# Patient Record
Sex: MALE | Race: BLACK OR AFRICAN AMERICAN | Employment: UNEMPLOYED | URBAN - METROPOLITAN AREA
[De-identification: names, ages, dates, MRNs, and addresses within clinical notes are randomized per-mention and may not be internally consistent; named-entity substitution may affect disease eponyms.]

---
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
## 2020-11-05 ENCOUNTER — HOSPITAL ENCOUNTER (EMERGENCY)
Facility: HOSPITAL | Age: 20
Discharge: HOME | End: 2020-11-05
Attending: EMERGENCY MEDICINE
Payer: MEDICAID

## 2020-11-05 VITALS
DIASTOLIC BLOOD PRESSURE: 77 MMHG | RESPIRATION RATE: 17 BRPM | OXYGEN SATURATION: 99 % | SYSTOLIC BLOOD PRESSURE: 127 MMHG | HEART RATE: 81 BPM | TEMPERATURE: 98 F

## 2020-11-05 DIAGNOSIS — L02.01 FACIAL ABSCESS: Primary | ICD-10-CM

## 2020-11-05 PROCEDURE — 87205 SMEAR GRAM STAIN: CPT | Performed by: EMERGENCY MEDICINE

## 2020-11-05 PROCEDURE — 87077 CULTURE AEROBIC IDENTIFY: CPT | Performed by: EMERGENCY MEDICINE

## 2020-11-05 PROCEDURE — 99284 EMERGENCY DEPT VISIT MOD MDM: CPT

## 2020-11-05 PROCEDURE — 87070 CULTURE OTHR SPECIMN AEROBIC: CPT | Performed by: EMERGENCY MEDICINE

## 2020-11-05 PROCEDURE — 10060 I&D ABSCESS SIMPLE/SINGLE: CPT

## 2020-11-05 RX ORDER — CEPHALEXIN 500 MG/1
500 CAPSULE ORAL 4 TIMES DAILY
Qty: 40 CAPSULE | Refills: 0 | Status: SHIPPED | OUTPATIENT
Start: 2020-11-05 | End: 2020-11-15

## 2020-11-05 NOTE — ED INITIAL ASSESSMENT (HPI)
Patient aox3 to ed via private vehicle patient co right cheek wound check x 3 days ago. Now swelling noted to right side of face and right eye. Denies vision changes pain 8/10 denies fever.  +redness

## 2020-11-05 NOTE — ED PROVIDER NOTES
Patient Seen in: [facility] AND [facility] Emergency Department      History   Patient presents with:  Abscess    Stated Complaint: eye and facial swelling    HPI    13-year-old male with history of asthma presents with complaints of facial swelling and mild bart through XII intact. Skin: warm and dry, no rashes. There is a 2 cm area of induration and tenderness to the right cheek with some superficial scabbing towards the center. There is some swelling noted to the right cheek and beneath the right eye.   There

## 2021-08-25 ENCOUNTER — HOSPITAL ENCOUNTER (EMERGENCY)
Facility: HOSPITAL | Age: 21
Discharge: HOME | End: 2021-08-25
Attending: EMERGENCY MEDICINE
Payer: MEDICAID

## 2021-08-25 VITALS
WEIGHT: 90 LBS | HEART RATE: 78 BPM | RESPIRATION RATE: 18 BRPM | TEMPERATURE: 98 F | OXYGEN SATURATION: 98 % | DIASTOLIC BLOOD PRESSURE: 77 MMHG | BODY MASS INDEX: 15 KG/M2 | SYSTOLIC BLOOD PRESSURE: 124 MMHG

## 2021-08-25 DIAGNOSIS — S80.861A INSECT BITE OF RIGHT LOWER LEG, INITIAL ENCOUNTER: Primary | ICD-10-CM
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
## 2021-08-25 LAB
BASOPHILS # BLD AUTO: 0.02 X10(3) UL (ref 0–0.2)
BASOPHILS NFR BLD AUTO: 0.5 %
DEPRECATED RDW RBC AUTO: 38.4 FL (ref 35.1–46.3)
EOSINOPHIL # BLD AUTO: 0.25 X10(3) UL (ref 0–0.7)
EOSINOPHIL NFR BLD AUTO: 5.7 %
ERYTHROCYTE [DISTWIDTH] IN BLOOD BY AUTOMATED COUNT: 16.4 % (ref 11–15)
HCT VFR BLD AUTO: 38.1 %
HGB BLD-MCNC: 12.5 G/DL
IMM GRANULOCYTES # BLD AUTO: 0.02 X10(3) UL (ref 0–1)
IMM GRANULOCYTES NFR BLD: 0.5 %
LYMPHOCYTES # BLD AUTO: 1.18 X10(3) UL (ref 1–4)
LYMPHOCYTES NFR BLD AUTO: 26.7 %
MCH RBC QN AUTO: 22 PG (ref 26–34)
MCHC RBC AUTO-ENTMCNC: 32.8 G/DL (ref 31–37)
MCV RBC AUTO: 67 FL
MONOCYTES # BLD AUTO: 0.27 X10(3) UL (ref 0.1–1)
MONOCYTES NFR BLD AUTO: 6.1 %
NEUTROPHILS # BLD AUTO: 2.68 X10 (3) UL (ref 1.5–7.7)
NEUTROPHILS # BLD AUTO: 2.68 X10(3) UL (ref 1.5–7.7)
NEUTROPHILS NFR BLD AUTO: 60.5 %
PLATELET # BLD AUTO: 138 10(3)UL (ref 150–450)
RBC # BLD AUTO: 5.69 X10(6)UL
WBC # BLD AUTO: 4.4 X10(3) UL (ref 4–11)

## 2021-08-25 PROCEDURE — 36415 COLL VENOUS BLD VENIPUNCTURE: CPT

## 2021-08-25 PROCEDURE — 85025 COMPLETE CBC W/AUTO DIFF WBC: CPT | Performed by: EMERGENCY MEDICINE

## 2021-08-25 PROCEDURE — 99283 EMERGENCY DEPT VISIT LOW MDM: CPT

## 2021-08-25 PROCEDURE — 85060 BLOOD SMEAR INTERPRETATION: CPT | Performed by: EMERGENCY MEDICINE

## 2021-08-25 RX ORDER — HYDROXYZINE HYDROCHLORIDE 25 MG/1
TABLET, FILM COATED ORAL EVERY 4 HOURS PRN
Qty: 20 TABLET | Refills: 0 | Status: SHIPPED | OUTPATIENT
Start: 2021-08-25 | End: 2021-09-24

## 2021-08-25 RX ORDER — DIAPER,BRIEF,INFANT-TODD,DISP
1 EACH MISCELLANEOUS 3 TIMES DAILY PRN
Qty: 1 EACH | Refills: 0 | Status: SHIPPED | OUTPATIENT
Start: 2021-08-25 | End: 2021-09-01

## 2021-08-25 NOTE — ED PROVIDER NOTES
Patient Seen in: [facility] AND [facility] Emergency Department      History   Patient presents with:  Rash Skin Problem    Stated Complaint: rash on legs and back    HPI/Subjective:   HPI    23year old male with a past medical history of sickle cell beta thala Pulmonary effort is normal. No respiratory distress. Musculoskeletal:         General: No deformity. Normal range of motion. Cervical back: Normal range of motion and neck supple. Skin:     General: Skin is warm and dry. Findings: No rash. check a platelet count and this was only mildly below normal, not concerning at this time.           Disposition and Plan     Clinical Impression:  Insect bite of right lower leg, initial encounter  (primary encounter diagnosis)     Disposition:  Discharge

## 2021-09-14 NOTE — ED NOTES
[facility] to arrive in 25 minutes no discharge, no irritation, no pain, no redness, and no visual changes.

## 2023-01-01 ENCOUNTER — ANESTHESIA (OUTPATIENT)
Dept: MEDSURG UNIT | Facility: HOSPITAL | Age: 23
End: 2023-01-01
Payer: MEDICAID

## 2023-01-01 ENCOUNTER — APPOINTMENT (OUTPATIENT)
Dept: GENERAL RADIOLOGY | Facility: HOSPITAL | Age: 23
End: 2023-01-01
Payer: MEDICAID

## 2023-01-01 ENCOUNTER — HOSPITAL ENCOUNTER (INPATIENT)
Facility: HOSPITAL | Age: 23
LOS: 1 days | End: 2023-01-01
Attending: EMERGENCY MEDICINE | Admitting: INTERNAL MEDICINE
Payer: MEDICAID

## 2023-01-01 ENCOUNTER — APPOINTMENT (OUTPATIENT)
Dept: GENERAL RADIOLOGY | Facility: HOSPITAL | Age: 23
End: 2023-01-01
Attending: EMERGENCY MEDICINE
Payer: MEDICAID

## 2023-01-01 ENCOUNTER — APPOINTMENT (OUTPATIENT)
Dept: CV DIAGNOSTICS | Facility: HOSPITAL | Age: 23
End: 2023-01-01
Attending: INTERNAL MEDICINE
Payer: MEDICAID

## 2023-01-01 ENCOUNTER — ANESTHESIA EVENT (OUTPATIENT)
Dept: MEDSURG UNIT | Facility: HOSPITAL | Age: 23
End: 2023-01-01
Payer: MEDICAID

## 2023-01-01 VITALS
HEART RATE: 78 BPM | TEMPERATURE: 86 F | WEIGHT: 120 LBS | SYSTOLIC BLOOD PRESSURE: 80 MMHG | DIASTOLIC BLOOD PRESSURE: 57 MMHG | OXYGEN SATURATION: 48 % | RESPIRATION RATE: 66 BRPM

## 2023-01-01 DIAGNOSIS — E87.5 HYPERKALEMIA: ICD-10-CM

## 2023-01-01 DIAGNOSIS — D64.9 ANEMIA, UNSPECIFIED TYPE: ICD-10-CM

## 2023-01-01 DIAGNOSIS — I46.9 CARDIAC ARREST (HCC): Primary | ICD-10-CM

## 2023-01-01 DIAGNOSIS — E87.20 LACTIC ACIDOSIS: ICD-10-CM

## 2023-01-01 DIAGNOSIS — R57.1 HYPOVOLEMIC SHOCK (HCC): ICD-10-CM

## 2023-01-01 LAB
ALBUMIN SERPL-MCNC: 1.1 G/DL (ref 3.4–5)
ALBUMIN SERPL-MCNC: 3.7 G/DL (ref 3.4–5)
ALBUMIN/GLOB SERPL: 0.6 {RATIO} (ref 1–2)
ALBUMIN/GLOB SERPL: 0.9 {RATIO} (ref 1–2)
ALP LIVER SERPL-CCNC: 271 U/L
ALP LIVER SERPL-CCNC: 662 U/L
ALT SERPL-CCNC: 309 U/L
ALT SERPL-CCNC: 8272 U/L
ANION GAP SERPL CALC-SCNC: 30 MMOL/L (ref 0–18)
ANION GAP SERPL CALC-SCNC: 35 MMOL/L (ref 0–18)
ANTIBODY SCREEN: NEGATIVE
AST SERPL-CCNC: 423 U/L (ref 15–37)
AST SERPL-CCNC: 4516 U/L (ref 15–37)
BASE EXCESS BLDA CALC-SCNC: -26 MMOL/L (ref ?–2)
BASE EXCESS BLDA CALC-SCNC: -9 MMOL/L (ref ?–2)
BASOPHILS # BLD: 0 X10(3) UL (ref 0–0.2)
BASOPHILS NFR BLD: 0 %
BILIRUB SERPL-MCNC: 1.6 MG/DL (ref 0.1–2)
BILIRUB SERPL-MCNC: 3.2 MG/DL (ref 0.1–2)
BODY TEMPERATURE: 98.6 F
BUN BLD-MCNC: 33 MG/DL (ref 7–18)
BUN BLD-MCNC: 34 MG/DL (ref 7–18)
CA-I BLD-SCNC: 1.34 MMOL/L (ref 0.95–1.32)
CA-I BLD-SCNC: 1.61 MMOL/L (ref 0.95–1.32)
CALCIUM BLD-MCNC: 11.7 MG/DL (ref 8.5–10.1)
CALCIUM BLD-MCNC: 7.1 MG/DL (ref 8.5–10.1)
CHLORIDE SERPL-SCNC: 106 MMOL/L (ref 98–112)
CHLORIDE SERPL-SCNC: 107 MMOL/L (ref 98–112)
CHOLEST SERPL-MCNC: 97 MG/DL (ref ?–200)
CO2 SERPL-SCNC: 10 MMOL/L (ref 21–32)
CO2 SERPL-SCNC: 9 MMOL/L (ref 21–32)
COHGB MFR BLD: 0.7 % SAT (ref 0–3)
COHGB MFR BLD: 1.3 % SAT (ref 0–3)
CREAT BLD-MCNC: 2.33 MG/DL
CREAT BLD-MCNC: 2.69 MG/DL
EOSINOPHIL # BLD: 0.09 X10(3) UL (ref 0–0.7)
EOSINOPHIL NFR BLD: 1 %
ERYTHROCYTE [DISTWIDTH] IN BLOOD BY AUTOMATED COUNT: 15.6 %
ERYTHROCYTE [DISTWIDTH] IN BLOOD BY AUTOMATED COUNT: 20.2 %
FIO2: 100 %
GFR SERPLBLD BASED ON 1.73 SQ M-ARVRAT: 33 ML/MIN/1.73M2 (ref 60–?)
GFR SERPLBLD BASED ON 1.73 SQ M-ARVRAT: 39 ML/MIN/1.73M2 (ref 60–?)
GLOBULIN PLAS-MCNC: 1.7 G/DL (ref 2.8–4.4)
GLOBULIN PLAS-MCNC: 4 G/DL (ref 2.8–4.4)
GLUCOSE BLD-MCNC: 315 MG/DL (ref 70–99)
GLUCOSE BLD-MCNC: 319 MG/DL (ref 70–99)
GLUCOSE BLD-MCNC: 54 MG/DL (ref 70–99)
HCO3 BLDA-SCNC: 17.9 MEQ/L (ref 21–27)
HCT VFR BLD AUTO: 12.5 %
HCT VFR BLD AUTO: 5 %
HDLC SERPL-MCNC: 26 MG/DL (ref 40–59)
HGB BLD-MCNC: 3.8 G/DL
HGB BLD-MCNC: 7.2 G/DL
HGB BLD-MCNC: 9.5 G/DL
HGB BLD-MCNC: <2 G/DL
HGB BLD-MCNC: <6.4 G/DL
INR BLD: 12.8 (ref 0.85–1.16)
L/M: 15 L/MIN
L/M: 15 L/MIN
LACTATE BLD-SCNC: >16.5 MMOL/L (ref 0.5–2)
LACTATE BLD-SCNC: >16.5 MMOL/L (ref 0.5–2)
LACTATE SERPL-SCNC: 29.6 MMOL/L (ref 0.4–2)
LACTATE SERPL-SCNC: 30.4 MMOL/L (ref 0.4–2)
LDLC SERPL CALC-MCNC: 36 MG/DL (ref ?–100)
LYMPHOCYTES NFR BLD: 5.98 X10(3) UL (ref 1–4)
LYMPHOCYTES NFR BLD: 65 %
MAGNESIUM SERPL-MCNC: 4.2 MG/DL (ref 1.6–2.6)
MCH RBC QN AUTO: 22.1 PG (ref 26–34)
MCH RBC QN AUTO: 27.1 PG (ref 26–34)
MCHC RBC AUTO-ENTMCNC: 30 G/DL (ref 31–37)
MCHC RBC AUTO-ENTMCNC: 30.4 G/DL (ref 31–37)
MCV RBC AUTO: 73.5 FL
MCV RBC AUTO: 89.3 FL
METHGB MFR BLD: 0 % SAT (ref 0.4–1.5)
METHGB MFR BLD: 1.6 % SAT (ref 0.4–1.5)
MONOCYTES # BLD: 0.18 X10(3) UL (ref 0.1–1)
MONOCYTES NFR BLD: 2 %
NEUTROPHILS # BLD AUTO: 2.2 X10 (3) UL (ref 1.5–7.7)
NEUTROPHILS NFR BLD: 30 %
NEUTS BAND NFR BLD: 2 %
NEUTS HYPERSEG # BLD: 2.94 X10(3) UL (ref 1.5–7.7)
NONHDLC SERPL-MCNC: 71 MG/DL (ref ?–130)
NRBC BLD MANUAL-RTO: 29 %
OSMOLALITY SERPL CALC.SUM OF ELEC: 307 MOSM/KG (ref 275–295)
OSMOLALITY SERPL CALC.SUM OF ELEC: 331 MOSM/KG (ref 275–295)
OXYHGB MFR BLDA: 36.7 % (ref 92–100)
OXYHGB MFR BLDA: 97 % (ref 92–100)
PCO2 BLDA: 36 MM HG (ref 35–45)
PCO2 BLDA: 41 MM HG (ref 35–45)
PCO2 BLDA: 75 MM HG (ref 35–45)
PEEP: 0 CM H2O
PH BLDA: 6.86 [PH] (ref 7.35–7.45)
PH BLDA: 7.28 [PH] (ref 7.35–7.45)
PH BLDA: <6.81 [PH] (ref 7.35–7.45)
PHOSPHATE SERPL-MCNC: 9.7 MG/DL (ref 2.5–4.9)
PLATELET # BLD AUTO: 16 10(3)UL (ref 150–450)
PLATELET # BLD AUTO: 25 10(3)UL (ref 150–450)
PLATELET MORPHOLOGY: NORMAL
PO2 BLDA: 36 MM HG (ref 80–100)
PO2 BLDA: 366 MM HG (ref 80–100)
PO2 BLDA: <7 MM HG (ref 80–100)
POTASSIUM BLD-SCNC: 7.3 MMOL/L (ref 3.6–5.1)
POTASSIUM BLD-SCNC: 7.4 MMOL/L (ref 3.6–5.1)
POTASSIUM SERPL-SCNC: 6.4 MMOL/L (ref 3.5–5.1)
POTASSIUM SERPL-SCNC: 7 MMOL/L (ref 3.5–5.1)
PROT SERPL-MCNC: 2.8 G/DL (ref 6.4–8.2)
PROT SERPL-MCNC: 7.7 G/DL (ref 6.4–8.2)
PROTHROMBIN TIME: 93.8 SECONDS (ref 11.6–14.8)
RBC # BLD AUTO: 0.68 X10(6)UL
RBC # BLD AUTO: 1.4 X10(6)UL
RH BLOOD TYPE: POSITIVE
SARS-COV-2 RNA RESP QL NAA+PROBE: NOT DETECTED
SODIUM BLD-SCNC: 144 MMOL/L (ref 135–145)
SODIUM BLD-SCNC: 149 MMOL/L (ref 135–145)
SODIUM SERPL-SCNC: 146 MMOL/L (ref 136–145)
SODIUM SERPL-SCNC: 151 MMOL/L (ref 136–145)
TIDAL VOLUME: 350 ML
TOTAL CELLS COUNTED BLD: 100
TRIGL SERPL-MCNC: 222 MG/DL (ref 30–149)
TROPONIN I HIGH SENSITIVITY: 190 NG/L
VENT RATE: 16 /MIN
VLDLC SERPL CALC-MCNC: 30 MG/DL (ref 0–30)
WBC # BLD AUTO: 8.4 X10(3) UL (ref 4–11)
WBC # BLD AUTO: 9.2 X10(3) UL (ref 4–11)

## 2023-01-01 PROCEDURE — 71045 X-RAY EXAM CHEST 1 VIEW: CPT | Performed by: EMERGENCY MEDICINE

## 2023-01-01 PROCEDURE — 3E033XZ INTRODUCTION OF VASOPRESSOR INTO PERIPHERAL VEIN, PERCUTANEOUS APPROACH: ICD-10-PCS | Performed by: INTERNAL MEDICINE

## 2023-01-01 PROCEDURE — 71045 X-RAY EXAM CHEST 1 VIEW: CPT

## 2023-01-01 PROCEDURE — 06HY33Z INSERTION OF INFUSION DEVICE INTO LOWER VEIN, PERCUTANEOUS APPROACH: ICD-10-PCS | Performed by: INTERNAL MEDICINE

## 2023-01-01 PROCEDURE — 76942 ECHO GUIDE FOR BIOPSY: CPT | Performed by: ANESTHESIOLOGY

## 2023-01-01 PROCEDURE — 5A12012 PERFORMANCE OF CARDIAC OUTPUT, SINGLE, MANUAL: ICD-10-PCS | Performed by: INTERNAL MEDICINE

## 2023-01-01 PROCEDURE — 93308 TTE F-UP OR LMTD: CPT | Performed by: INTERNAL MEDICINE

## 2023-01-01 PROCEDURE — 30233R1 TRANSFUSION OF NONAUTOLOGOUS PLATELETS INTO PERIPHERAL VEIN, PERCUTANEOUS APPROACH: ICD-10-PCS | Performed by: EMERGENCY MEDICINE

## 2023-01-01 PROCEDURE — 5A1935Z RESPIRATORY VENTILATION, LESS THAN 24 CONSECUTIVE HOURS: ICD-10-PCS | Performed by: INTERNAL MEDICINE

## 2023-01-01 PROCEDURE — 30233N1 TRANSFUSION OF NONAUTOLOGOUS RED BLOOD CELLS INTO PERIPHERAL VEIN, PERCUTANEOUS APPROACH: ICD-10-PCS | Performed by: INTERNAL MEDICINE

## 2023-01-01 PROCEDURE — 99292 CRITICAL CARE ADDL 30 MIN: CPT | Performed by: INTERNAL MEDICINE

## 2023-01-01 PROCEDURE — 99291 CRITICAL CARE FIRST HOUR: CPT | Performed by: INTERNAL MEDICINE

## 2023-01-01 RX ORDER — CHLORHEXIDINE GLUCONATE 0.12 MG/ML
15 RINSE ORAL
Status: DISCONTINUED | OUTPATIENT
Start: 2023-01-01 | End: 2023-01-01

## 2023-01-01 RX ORDER — DEXMEDETOMIDINE HYDROCHLORIDE 4 UG/ML
INJECTION, SOLUTION INTRAVENOUS CONTINUOUS
Status: DISCONTINUED | OUTPATIENT
Start: 2023-01-01 | End: 2023-01-01

## 2023-01-01 RX ORDER — ACETAMINOPHEN 650 MG/1
650 SUPPOSITORY RECTAL EVERY 4 HOURS PRN
Status: DISCONTINUED | OUTPATIENT
Start: 2023-01-01 | End: 2023-01-01 | Stop reason: ALTCHOICE

## 2023-01-01 RX ORDER — BISACODYL 10 MG
10 SUPPOSITORY, RECTAL RECTAL
Status: DISCONTINUED | OUTPATIENT
Start: 2023-01-01 | End: 2023-01-01

## 2023-01-01 RX ORDER — ENEMA 19; 7 G/133ML; G/133ML
1 ENEMA RECTAL ONCE AS NEEDED
Status: DISCONTINUED | OUTPATIENT
Start: 2023-01-01 | End: 2023-01-01

## 2023-01-01 RX ORDER — SENNOSIDES 8.8 MG/5ML
10 LIQUID ORAL NIGHTLY PRN
Status: DISCONTINUED | OUTPATIENT
Start: 2023-01-01 | End: 2023-01-01

## 2023-01-01 RX ORDER — SODIUM BICARBONATE 150 MEQ/1,000 ML IN DEXTROSE 5 % INTRAVENOUS
150 SOLUTION CONTINUOUS
Status: DISCONTINUED | OUTPATIENT
Start: 2023-01-01 | End: 2023-01-01

## 2023-01-01 RX ORDER — SODIUM CHLORIDE 9 MG/ML
INJECTION, SOLUTION INTRAVENOUS ONCE
Status: DISCONTINUED | OUTPATIENT
Start: 2023-01-01 | End: 2023-01-01

## 2023-01-01 RX ORDER — BUSPIRONE HYDROCHLORIDE 15 MG/1
30 TABLET ORAL EVERY 8 HOURS
Status: DISCONTINUED | OUTPATIENT
Start: 2023-01-01 | End: 2023-01-01

## 2023-01-01 RX ORDER — CALCIUM CHLORIDE 100 MG/ML
INJECTION INTRAVENOUS; INTRAVENTRICULAR
Status: COMPLETED | OUTPATIENT
Start: 2023-01-01 | End: 2023-01-01

## 2023-01-01 RX ORDER — ACETAMINOPHEN 650 MG/1
650 SUPPOSITORY RECTAL EVERY 6 HOURS SCHEDULED
Status: DISCONTINUED | OUTPATIENT
Start: 2023-01-01 | End: 2023-01-01

## 2023-01-01 RX ORDER — ACETAMINOPHEN 10 MG/ML
1000 INJECTION, SOLUTION INTRAVENOUS EVERY 6 HOURS PRN
Status: DISCONTINUED | OUTPATIENT
Start: 2023-01-01 | End: 2023-01-01 | Stop reason: ALTCHOICE

## 2023-01-01 RX ORDER — FUROSEMIDE 10 MG/ML
40 INJECTION INTRAMUSCULAR; INTRAVENOUS ONCE
Status: DISCONTINUED | OUTPATIENT
Start: 2023-01-01 | End: 2023-01-01

## 2023-01-01 RX ORDER — PROCHLORPERAZINE EDISYLATE 5 MG/ML
5 INJECTION INTRAMUSCULAR; INTRAVENOUS EVERY 8 HOURS PRN
Status: DISCONTINUED | OUTPATIENT
Start: 2023-01-01 | End: 2023-01-01

## 2023-01-01 RX ORDER — PHENYLEPHRINE HCL IN 0.9% NACL 50MG/250ML
PLASTIC BAG, INJECTION (ML) INTRAVENOUS CONTINUOUS
Status: DISCONTINUED | OUTPATIENT
Start: 2023-01-01 | End: 2023-01-01

## 2023-01-01 RX ORDER — FUROSEMIDE 10 MG/ML
INJECTION INTRAMUSCULAR; INTRAVENOUS
Status: DISCONTINUED
Start: 2023-01-01 | End: 2023-01-01

## 2023-01-01 RX ORDER — POLYETHYLENE GLYCOL 3350 17 G/17G
17 POWDER, FOR SOLUTION ORAL DAILY PRN
Status: DISCONTINUED | OUTPATIENT
Start: 2023-01-01 | End: 2023-01-01

## 2023-01-01 RX ORDER — ACETAMINOPHEN 325 MG/1
650 TABLET ORAL EVERY 4 HOURS PRN
Status: DISCONTINUED | OUTPATIENT
Start: 2023-01-01 | End: 2023-01-01 | Stop reason: ALTCHOICE

## 2023-01-01 RX ORDER — PHENYLEPHRINE HCL IN 0.9% NACL 50MG/250ML
PLASTIC BAG, INJECTION (ML) INTRAVENOUS
Status: COMPLETED
Start: 2023-01-01 | End: 2023-01-01

## 2023-01-01 RX ORDER — ACETAMINOPHEN 160 MG/5ML
650 SOLUTION ORAL EVERY 6 HOURS SCHEDULED
Status: DISCONTINUED | OUTPATIENT
Start: 2023-01-01 | End: 2023-01-01

## 2023-01-01 RX ORDER — ONDANSETRON 2 MG/ML
4 INJECTION INTRAMUSCULAR; INTRAVENOUS EVERY 6 HOURS PRN
Status: DISCONTINUED | OUTPATIENT
Start: 2023-01-01 | End: 2023-01-01

## 2023-01-01 RX ORDER — ACETAMINOPHEN 160 MG/5ML
650 SOLUTION ORAL EVERY 4 HOURS PRN
Status: DISCONTINUED | OUTPATIENT
Start: 2023-01-01 | End: 2023-01-01 | Stop reason: ALTCHOICE

## 2023-05-19 PROBLEM — E87.5 HYPERKALEMIA: Status: ACTIVE | Noted: 2023-01-01

## 2023-05-19 PROBLEM — I46.9 CARDIAC ARREST (HCC): Status: ACTIVE | Noted: 2023-01-01

## 2023-05-19 PROBLEM — E87.20 LACTIC ACIDOSIS: Status: ACTIVE | Noted: 2023-01-01

## 2023-05-19 PROBLEM — R57.1 HYPOVOLEMIC SHOCK (HCC): Status: ACTIVE | Noted: 2023-01-01

## 2023-05-19 PROBLEM — D64.9 ANEMIA, UNSPECIFIED TYPE: Status: ACTIVE | Noted: 2023-01-01

## 2023-05-19 NOTE — ANESTHESIA PROCEDURE NOTES
Arterial Line    Date/Time: 5/19/2023 2:29 PM    Performed by: Kaur Salazar MD  Authorized by: Kaur Salazar MD    General Information and Staff    Procedure Start:  5/19/2023 2:29 PM  Procedure End:  5/19/2023 2:41 PM  Anesthesiologist:  Kaur Salazar MD  Performed By:  Anesthesiologist  Patient Location:  ICU  Indication: continuous blood pressure monitoring and blood sampling needed    Site Identification: real time ultrasound guided and image stored and retrievable      Procedure Details    Catheter Size:   Catheter Length:  5 inch  Catheter Type:  Arrow  Seldinger Technique?: Yes    Laterality:  Left  Site:  Femoral artery  Site Prep: chlorhexidine    Line Secured:  Tegaderm and tape    Assessment    Events: patient tolerated procedure well with no complications      Medications  5/19/2023 2:29 PM      Additional Comments    Patient s/p cardiac arrest earlier today. Intubated, maximal vasopressor support. Ultrasound scan of bilateral upper extremities revealed arterial vessels of insufficient caliber for cannulation. Left femoral artery accessed easily x 1 under ultrasound guidance. Needle exchanged over guidewire for arterial catheter. Appropriate waveform on bedside monitor.

## 2023-05-19 NOTE — RESPIRATORY THERAPY NOTE
Patient came in via ambulance intubated and compressions in progress. Unknown down time. 7.5 ETT 26 at lip. diminishebreath sounds on left. ETT repositioned to 23 at lip and secured with holister.   Placed on ventilator  abg attempt multiple times

## 2023-05-19 NOTE — PROGRESS NOTES
05/19/23 1708   Clinical Encounter Type   Visited With Health care provider;Family   Crisis Visit Critical care   Taxonomy   Intended Effects De-escalate emotionally charged situations   Methods Offer support; Offer emotional support   Interventions Acknowledge current situation; Acknowledge response to difficult experience; Active listening; Ask guided questions;Silent prayer; Facilitate communication between patient and/or family member and care team;Facilitate communication between patient/family member(s); Respond as  to a defined crisis event     Discussion:   responded to Code Blue and assessed the situation. Family was not present but arrived later.  provided on going support for the family as they waited for a time they could see Pt (Francis). The  team aided in communication between the healthcare team and the family and helped create time for family members to visit [facility].  team introduced incoming evening  to the family. Family is aware that chaplains are available for continued support and have the on-call  night should they require additional support. Spiritual Care support can be requested via an Epic consult or ext. [phone].        Akua Rodriguez M.Div, Chaplain Resident  Ext. [phone]

## 2023-05-19 NOTE — PROGRESS NOTES
05/19/23 1130   Clinical Encounter Type   Visited With Health care provider;Family   Routine Visit   (Responded to the page)   Patient's Supportive Strategies/Resources Mom came   Family Spiritual Encounters   Family Coping Anxiety  (mom)   Taxonomy   Intended Effects Lessen anxiety   Methods Offer support   Interventions Acknowledge current situation; Active listening;Silent prayer;Provide hospitality      responded to the code. Provided compassionate listening presence and support to the mom when she arrived. Acknowledged the situation/condition. Extended support to our tx team. Established a rapport. Spiritual Care support can be requested via an Epic consult. For urgent/immediate needs, please contact the On Call  at ext. [phone].

## 2023-05-19 NOTE — PLAN OF CARE
Received pt from ED around 1235 intubated on no sedation with bicarb infusing. Intensivist and CCAPN at bedside to assess. 2 amps bicarb given per order from intensivist. Levo started at max dose per order. Around 1241 there was no palpable pulse with PEA on monitor; ACLS initiated. ROSC achieved. See code documentation. Around 1248 pt again lost pulse with PEA on monitor; ACLS initiated. ROSC achieved. See code documentation. Vaso and drew started at max dose per order. Stat echo obtained. Femoral triple lumen central line placed by intensivist. Femoral a-line placed by anesthesia. Epi gtt started and titrated per order from [address] [place] [address]. Hematology and neurology consulted per order. Pt hypothermic, warm blankets and pawel hugger applied. All critical lab values reported to [address] [place] [address]. 2 units platelets, 2 units FFP and 1 unit PRBCs transfused per order. Around 1801 pt again lost pulse with PEA on monitor; ACLS initiated. See code documentation. Family at bedside and resuscitative measures stopped per family request. Pt  at [address]

## 2023-05-19 NOTE — CODE DOCUMENTATION
Pulm/CC Code note  1pm    - while performing initial exam, pt developed PEA arrest with loss of pulse by doppler. CPR was initiated and 3 amps of bicarb administered as well as a total of 3 amps of epi with return of spontaneous circulation. During that time copious amounts of frothy bloody secretions from ETT were noted. Pressors were initiated along with bicarb gtt; after CPR was initiated and meds circulated, transient BP of ~190/110 was obtained and slowly dropped from there. Eventually maintaining ~ . Total time of CPR: ~15 min. Christine Jules MD      Pulm/CC Code Note 6pm    - pt developed samir-arrest followed by PEA- CPR performed for ten minutes without ability to regain pulse. Bleeding from all orifices including IV sites and GI tract noted during CPR c/w DIC bleeding. Discussed with all family members. Unable to regain spont circulation and code called at 6;15pm to allow family to be with pt during his last few moments. No discomfort noted. Agonal rhythm and breaths noted. Family at bedside.     Christine Jules MD

## 2023-05-19 NOTE — PROCEDURES
[address] Patient Status:  Inpatient    10/5/2000 MRN [id number]   [facility] 4SW-A Attending Bianca Flores MD   Hosp Day # 0 PCP [facility]             Procedure        : Right femoral vein triple lumen catheter with ultrasound guidance  Indication          : vasopressors  Consent           : medical necessity  Timeout           : performed at bedside  (s)     :  TRINITY Verduzco, Dr. Madelin Gomez MD  Complications : none  EBL                 : 0  Meds:                : none    Consent unable to be obtained due to emergent nature of procedure. Timeout performed at bedside. Patient prepped and draped in sterile fashion, and pre-medicated as above. The right femoral vein was visualized by ultrasound, and distinguished from the adjacent artery as being easily compressible. The right femoral vein was seen to be entered by the 18g introducer needle under direct ultrasound guidance with 1 skin break(s). Venous location was verified by return of dark, non-pulsatile blood return. A 7fr triple lumen catheter was advanced by modified seldinger technique. All ports flush and draw easily. No obvious complications noted. Chest x-ray is pending.     TRINITY Verduzco  Critical Care NP  [id number]

## 2023-05-20 LAB
BLOOD TYPE BARCODE: 5100
BLOOD TYPE BARCODE: 9500
Q-T INTERVAL: 216 MS
QRS DURATION: 76 MS
QTC CALCULATION (BEZET): 361 MS
R AXIS: 25 DEGREES
T AXIS: 227 DEGREES
UNIT VOLUME: 250 ML
UNIT VOLUME: 267 ML
UNIT VOLUME: 350 ML
UNIT VOLUME: 350 ML
VENTRICULAR RATE: 168 BPM

## 2023-05-21 LAB
BLOOD TYPE BARCODE: 6200
UNIT VOLUME: 270 ML

## 2023-05-23 LAB
HGB A2 MFR BLD: 6.1 % (ref 1.5–3.5)
HGB F MFR BLD: 6.6 % (ref 0–2)
HGB PNL BLD ELPH: 16 % (ref 95.5–100)
HGB S BLD QL SOLY: POSITIVE
HGB S MFR BLD: 71.3 %

## (undated) NOTE — ED AVS SNAPSHOT
Ángel Maricruz   MRN: [id number]    Department:  [facility] Emergency Department   Date of Visit:  12/14/2017           Disclosure     Insurance plans vary and the physician(s) referred by the ER may not be covered by your plan.  Please contact you tell this physician (or your personal doctor if your instructions are to return to your personal doctor) about any new or lasting problems. The primary care or specialist physician will see patients referred from the [facility] Emergency Department.  [employer]